# Patient Record
Sex: MALE | Race: WHITE | NOT HISPANIC OR LATINO | Employment: STUDENT | URBAN - METROPOLITAN AREA
[De-identification: names, ages, dates, MRNs, and addresses within clinical notes are randomized per-mention and may not be internally consistent; named-entity substitution may affect disease eponyms.]

---

## 2023-03-17 PROBLEM — F41.9 ANXIETY: Status: ACTIVE | Noted: 2023-03-17

## 2023-03-17 PROBLEM — F32.4 MAJOR DEPRESSIVE DISORDER WITH SINGLE EPISODE, IN PARTIAL REMISSION (CMS-HCC): Status: ACTIVE | Noted: 2023-03-17

## 2023-03-17 PROBLEM — E55.9 HYPOVITAMINOSIS D: Status: ACTIVE | Noted: 2023-03-17

## 2023-03-17 PROBLEM — F32.A DEPRESSION: Status: ACTIVE | Noted: 2023-03-17

## 2023-03-17 PROBLEM — R53.81 MALAISE AND FATIGUE: Status: ACTIVE | Noted: 2023-03-17

## 2023-03-17 PROBLEM — R53.83 MALAISE AND FATIGUE: Status: ACTIVE | Noted: 2023-03-17

## 2023-03-17 PROBLEM — L70.0 ACNE VULGARIS: Status: ACTIVE | Noted: 2023-03-17

## 2023-03-17 PROBLEM — R63.4 WEIGHT LOSS: Status: ACTIVE | Noted: 2023-03-17

## 2023-03-17 RX ORDER — FLUOXETINE HYDROCHLORIDE 40 MG/1
1 CAPSULE ORAL DAILY
COMMUNITY
Start: 2022-05-11 | End: 2023-03-18 | Stop reason: SDUPTHER

## 2023-03-18 DIAGNOSIS — F32.A DEPRESSION, UNSPECIFIED DEPRESSION TYPE: ICD-10-CM

## 2023-03-20 RX ORDER — FLUOXETINE HYDROCHLORIDE 40 MG/1
40 CAPSULE ORAL DAILY
Qty: 30 CAPSULE | Refills: 0 | Status: SHIPPED | OUTPATIENT
Start: 2023-03-20 | End: 2023-03-22 | Stop reason: SDUPTHER

## 2023-03-22 ENCOUNTER — TELEMEDICINE (OUTPATIENT)
Dept: PEDIATRICS | Facility: CLINIC | Age: 23
End: 2023-03-22
Payer: COMMERCIAL

## 2023-03-22 VITALS — BODY MASS INDEX: 29.6 KG/M2 | WEIGHT: 225 LBS

## 2023-03-22 DIAGNOSIS — F32.A DEPRESSION, UNSPECIFIED DEPRESSION TYPE: ICD-10-CM

## 2023-03-22 PROBLEM — E55.9 HYPOVITAMINOSIS D: Status: RESOLVED | Noted: 2023-03-17 | Resolved: 2023-03-22

## 2023-03-22 PROBLEM — R63.4 WEIGHT LOSS: Status: RESOLVED | Noted: 2023-03-17 | Resolved: 2023-03-22

## 2023-03-22 PROCEDURE — 99213 OFFICE O/P EST LOW 20 MIN: CPT | Performed by: PEDIATRICS

## 2023-03-22 RX ORDER — FLUOXETINE HYDROCHLORIDE 40 MG/1
40 CAPSULE ORAL DAILY
Qty: 30 CAPSULE | Refills: 5 | Status: SHIPPED | OUTPATIENT
Start: 2023-04-15 | End: 2023-12-14 | Stop reason: SDUPTHER

## 2023-12-14 ENCOUNTER — TELEMEDICINE (OUTPATIENT)
Dept: PEDIATRICS | Facility: CLINIC | Age: 23
End: 2023-12-14
Payer: COMMERCIAL

## 2023-12-14 DIAGNOSIS — F32.A DEPRESSION, UNSPECIFIED DEPRESSION TYPE: ICD-10-CM

## 2023-12-14 PROCEDURE — 99213 OFFICE O/P EST LOW 20 MIN: CPT | Performed by: PEDIATRICS

## 2023-12-14 RX ORDER — FLUOXETINE HYDROCHLORIDE 40 MG/1
40 CAPSULE ORAL DAILY
Qty: 30 CAPSULE | Refills: 5 | Status: SHIPPED | OUTPATIENT
Start: 2023-12-14 | End: 2024-06-11

## 2023-12-14 NOTE — PATIENT INSTRUCTIONS
Go to bed every day at 2 am after you get home from work.   No screens after work before you go to bed.   Get up at 9-10 every day.  No nap.   Put the medication next to your toothbrush so that you remember to take the Fluoxetine every morning when you brush your teeth.

## 2023-12-14 NOTE — PROGRESS NOTES
"Subjective   Patient ID: Jacoby Campos is a 23 y.o. male, with depression and anxiety for which he is treated with Fluoxetine 40 mg once daily, who presents today for follow-up.  He presents today by himself    HPI:  He reports that he is doing much better on the Fluoxetine, \"It's almost a night and day difference.\"  \"I am feeling less in my head and more me.\"  He reports that he struggles taking the medication daily, about 1-2 times per week.   He works at [employer], 3:30 pm to 12 midnight every day.  He goes to bed at 4 am and wakes up around 12 noon.   On the weekends, he is in bed at 10 and up before 10.   He reports that he feels that he could be doing better.   No SI/HI.   He renews his promise today to immediately inform his parents or me if he develops any SI/HI.     Objective   There were no vitals taken for this visit.  Physical Exam  Vitals reviewed. Exam conducted with a chaperone present.   Constitutional:       Appearance: Normal appearance.   HENT:      Nose: Nose normal.      Mouth/Throat:      Pharynx: No posterior oropharyngeal erythema.      Tonsils: 2+ on the right. 2+ on the left.   Eyes:      Pupils: Pupils are equal, round, and reactive to light.   Pulmonary:      Effort: Pulmonary effort is normal. No respiratory distress.   Skin:     General: Skin is warm.   Neurological:      Mental Status: He is alert.       Assessment/Plan   Diagnoses and all orders for this visit:  Depression, unspecified depression type  -     FLUoxetine (PROzac) 40 mg capsule; Take 1 capsule (40 mg) by mouth once daily.  Other orders  -     Follow Up In Pediatrics; Future      "